# Patient Record
Sex: FEMALE | Race: BLACK OR AFRICAN AMERICAN | NOT HISPANIC OR LATINO
[De-identification: names, ages, dates, MRNs, and addresses within clinical notes are randomized per-mention and may not be internally consistent; named-entity substitution may affect disease eponyms.]

---

## 2019-12-18 ENCOUNTER — RESULT REVIEW (OUTPATIENT)
Age: 49
End: 2019-12-18

## 2020-01-29 PROBLEM — Z00.00 ENCOUNTER FOR PREVENTIVE HEALTH EXAMINATION: Status: ACTIVE | Noted: 2020-01-29

## 2021-03-26 ENCOUNTER — RESULT REVIEW (OUTPATIENT)
Age: 51
End: 2021-03-26

## 2023-10-11 ENCOUNTER — APPOINTMENT (OUTPATIENT)
Dept: ORTHOPEDIC SURGERY | Facility: CLINIC | Age: 53
End: 2023-10-11
Payer: COMMERCIAL

## 2023-10-11 VITALS — BODY MASS INDEX: 27.6 KG/M2 | WEIGHT: 150 LBS | HEIGHT: 62 IN

## 2023-10-11 DIAGNOSIS — S69.91XA UNSPECIFIED INJURY OF RIGHT WRIST, HAND AND FINGER(S), INITIAL ENCOUNTER: ICD-10-CM

## 2023-10-11 PROCEDURE — 99203 OFFICE O/P NEW LOW 30 MIN: CPT

## 2023-10-21 ENCOUNTER — NON-APPOINTMENT (OUTPATIENT)
Age: 53
End: 2023-10-21

## 2023-10-26 ENCOUNTER — NON-APPOINTMENT (OUTPATIENT)
Age: 53
End: 2023-10-26

## 2023-10-26 ENCOUNTER — APPOINTMENT (OUTPATIENT)
Dept: ORTHOPEDIC SURGERY | Facility: CLINIC | Age: 53
End: 2023-10-26
Payer: COMMERCIAL

## 2023-10-26 PROCEDURE — 99213 OFFICE O/P EST LOW 20 MIN: CPT

## 2023-10-26 PROCEDURE — 73610 X-RAY EXAM OF ANKLE: CPT | Mod: RT

## 2023-11-29 ENCOUNTER — NON-APPOINTMENT (OUTPATIENT)
Age: 53
End: 2023-11-29

## 2023-11-30 ENCOUNTER — APPOINTMENT (OUTPATIENT)
Dept: ORTHOPEDIC SURGERY | Facility: CLINIC | Age: 53
End: 2023-11-30
Payer: COMMERCIAL

## 2023-11-30 PROCEDURE — 99213 OFFICE O/P EST LOW 20 MIN: CPT

## 2023-12-22 ENCOUNTER — NON-APPOINTMENT (OUTPATIENT)
Age: 53
End: 2023-12-22

## 2024-01-04 ENCOUNTER — APPOINTMENT (OUTPATIENT)
Dept: ORTHOPEDIC SURGERY | Facility: CLINIC | Age: 54
End: 2024-01-04

## 2024-01-10 ENCOUNTER — APPOINTMENT (OUTPATIENT)
Dept: ORTHOPEDIC SURGERY | Facility: CLINIC | Age: 54
End: 2024-01-10

## 2024-01-19 ENCOUNTER — APPOINTMENT (OUTPATIENT)
Dept: ORTHOPEDIC SURGERY | Facility: CLINIC | Age: 54
End: 2024-01-19

## 2024-02-01 ENCOUNTER — NON-APPOINTMENT (OUTPATIENT)
Age: 54
End: 2024-02-01

## 2024-02-01 ENCOUNTER — APPOINTMENT (OUTPATIENT)
Dept: ORTHOPEDIC SURGERY | Facility: CLINIC | Age: 54
End: 2024-02-01
Payer: COMMERCIAL

## 2024-02-01 PROCEDURE — 99214 OFFICE O/P EST MOD 30 MIN: CPT

## 2024-02-01 NOTE — DISCUSSION/SUMMARY
[de-identified] : At this time I gave her prescription to start doing physical therapy.  We will resubmit for the MRI of the ankle.  She can call me after the MRI to go over the results.  Will schedule her follow-up for repeat evaluation..  Patient will call me if any other problems or concerns.  Patient verbalized understanding and agreed with the plan, all questions were answered in the office today.

## 2024-02-01 NOTE — HISTORY OF PRESENT ILLNESS
[de-identified] : 53-year-old female is here today for follow-up of her right ankle.  The injury happened back in October.  At her last visit in November she was starting to feel better so she never went for the MRI.  States lately she has been having pain again in the ankle.  She climbs a lot of stairs at work.  She denies any new injury or trauma.  She denies any numbness tingling or any calf pain.

## 2024-02-01 NOTE — IMAGING
[de-identified] : On examination of her right ankle she has mild swelling, no erythema, no ecchymosis.  She is tender over the lateral malleolus and the ATFL.  No tenderness over the medial malleolus.  No tenderness over  CFL PT FL or deltoid ligament.  No tenderness over the talar dome.  No tenderness over the Achilles or the calcaneus, negative Jose's test, no calf tenderness.  She has no tenderness to palpation over the foot.  She has good range of motion, sensation is intact throughout, 2+ DP and PT pulses.

## 2024-02-29 ENCOUNTER — APPOINTMENT (OUTPATIENT)
Dept: ORTHOPEDIC SURGERY | Facility: CLINIC | Age: 54
End: 2024-02-29

## 2024-03-07 ENCOUNTER — APPOINTMENT (OUTPATIENT)
Dept: ORTHOPEDIC SURGERY | Facility: CLINIC | Age: 54
End: 2024-03-07
Payer: SELF-PAY

## 2024-03-07 DIAGNOSIS — S90.01XA CONTUSION OF RIGHT ANKLE, INITIAL ENCOUNTER: ICD-10-CM

## 2024-03-07 PROCEDURE — 99213 OFFICE O/P EST LOW 20 MIN: CPT

## 2024-03-07 NOTE — DISCUSSION/SUMMARY
[de-identified] : At this time I again recommend she start doing physical therapy.  Will schedule her follow-up in 4 to 6 weeks for repeat evaluation with Dr. Tiwari if the pain is still not improving after the therapy.  Patient will call me if any other problems or concerns.  Patient verbalized understanding and agreed with the plan, all questions were answered in the office today.

## 2024-03-07 NOTE — IMAGING
[de-identified] : On examination of her right ankle she has mild swelling, no erythema, no ecchymosis.  She is tender over the lateral malleolus and the ATFL.  No tenderness over the medial malleolus.  No tenderness over  CFL PT FL or deltoid ligament.  No tenderness over the talar dome.  No tenderness over the Achilles or the calcaneus, negative Jose's test, no calf tenderness.  She has no tenderness to palpation over the foot.  She has good range of motion, sensation is intact throughout, 2+ DP and PT pulses.  MRI reviewed in the office today shows mild effusion of the talonavicular and subtalar joint. Tibialis posterior tenosynovitis, accessory flexor digitorum accessorius longus muscle coursing adjacent to the tibial neurovascular bundle, without evidence of compression/impingement. No ligament or tendon tears, no osteochondral injury.

## 2024-03-07 NOTE — HISTORY OF PRESENT ILLNESS
[de-identified] : 53-year-old female is here today for follow-up of her right ankle.  The injury happened back in October.  She started to feel better but came back about a month ago after pain got worse.  I sent her for an MRI.  She is here today for repeat evaluation, she states the pain is the same, she states it is not getting better.  She denies any new injury or trauma.  She did not start physical therapy, she was waiting to have the MRI done first.  She denies any numbness tingling or any calf pain.

## 2024-04-05 ENCOUNTER — APPOINTMENT (OUTPATIENT)
Dept: ORTHOPEDIC SURGERY | Facility: CLINIC | Age: 54
End: 2024-04-05